# Patient Record
Sex: FEMALE | Race: OTHER
[De-identification: names, ages, dates, MRNs, and addresses within clinical notes are randomized per-mention and may not be internally consistent; named-entity substitution may affect disease eponyms.]

---

## 2019-10-08 ENCOUNTER — HOSPITAL ENCOUNTER (OUTPATIENT)
Dept: HOSPITAL 62 - WI | Age: 54
End: 2019-10-08
Attending: INTERNAL MEDICINE
Payer: COMMERCIAL

## 2019-10-08 DIAGNOSIS — N64.89: ICD-10-CM

## 2019-10-08 DIAGNOSIS — Z12.31: Primary | ICD-10-CM

## 2019-10-08 PROCEDURE — 77067 SCR MAMMO BI INCL CAD: CPT

## 2019-10-08 NOTE — WOMENS IMAGING REPORT
EXAM DESCRIPTION:  BILAT SCREENING MAMMO W/CAD



COMPLETED DATE/TIME:  10/8/2019 8:51 am



REASON FOR STUDY:  Z12.31 SCREENING [id number]  ENCNTR SCREEN MAMMOGRAM FOR MALIGNANT NEOPLASM OF BR
E



COMPARISON:  Baseline examination.



EXAM PARAMETERS:  Standard craniocaudal and mediolateral oblique views of each breast recorded using 
digital acquisition.

Read with the assistance of CAD.

.[facility] - [email or web address]me  Version 9.2



LIMITATIONS:  None.



FINDINGS:  RIGHT BREAST

MASSES: No suspicious masses.

CALCIFICATIONS: No new or suspicious calcifications.

ARCHITECTURAL DISTORTION: None.

DEVELOPING DENSITY: None.

ASYMMETRY:  Asymmetry in the subareolar region, 3.0 cm from the nipple.  This finding is best seen on
 the RCC view.

OTHER: No other significant findings.

LEFT BREAST

MASSES: No suspicious masses.

CALCIFICATIONS: No new or suspicious calcifications.

ARCHITECTURAL DISTORTION: None.

DEVELOPING DENSITY: None.

ASYMMETRY: None noted.

OTHER: No other significant findings.



IMPRESSION:  1.  Asymmetry Right breast.

0 Incomplete: Needs Additional Imaging Evaluation and/or prior Mammograms for Comparison.



BREAST DENSITY:  b. There are scattered areas of fibroglandular density.



BIRAD:  ASSESSMENT:  0 Incomplete:  Needs Additional Imaging Evaluation and/or prior Mammograms for C
omparison.



RECOMMENDATION:  1.  Special view mammogram Right breast:  digital tomosynthesis (preferable) or spot
 compression views, true lateral view included.  Ultrasound if needed.

The patient will be contacted for additional imaging.



COMMENT:  The patient has been notified of the results by letter per MQSA requirements. Additional no
tification policies are in place for contacting patient with suspicious or incomplete findings.

Quality ID #225: The American College of Radiology recommends an annual screening mammogram for women
 aged 40 years or over. This facility utilizes a reminder system to ensure that all patients receive 
reminder letters, and/or direct phone calls for appointments. This includes reminders for routine scr
eening mammograms, diagnostic mammograms, or other Breast Imaging Interventions when appropriate.  Th
is patient will be placed in the appropriate reminder system.



TECHNICAL DOCUMENTATION:  FINDING NUMBER: (1)

ASSESSMENT: (1)

JOB ID:  [id number]

 2011 [employer]- All Rights Reserved



Reading location - IP/workstation name: [email or web address]

## 2019-10-17 ENCOUNTER — HOSPITAL ENCOUNTER (OUTPATIENT)
Dept: HOSPITAL 62 - WI | Age: 54
End: 2019-10-17
Attending: INTERNAL MEDICINE
Payer: COMMERCIAL

## 2019-10-17 DIAGNOSIS — R92.2: Primary | ICD-10-CM

## 2019-10-17 PROCEDURE — 76642 ULTRASOUND BREAST LIMITED: CPT

## 2019-10-17 NOTE — WOMENS IMAGING REPORT
EXAM DESCRIPTION:  3D DX MAMMO RIGHT UNILAT; U/S BREAST UNILAT LIMITED



COMPLETED DATE/TIME:  10/17/2019 8:51 am; 10/17/2019 9:32 am



REASON FOR STUDY:  R92.2 RT DX; RT BREAST R92.2 R92.2  INCONCLUSIVE MAMMOGRAM



COMPARISON:  10/8/2019.



EXAM PARAMETERS:  True lateral and CC images acquired with tomosynthesis.  Spot compression lateral a
nd CC images also acquired.



LIMITATIONS:  None.



FINDINGS:  BREAST LATERALITY: right

MASSES: No suspicious masses.

CALCIFICATIONS: No new or suspicious calcifications.

ARCHITECTURAL DISTORTION: None.

DEVELOPING DENSITY: None.

ASYMMETRY: None noted.

OTHER: No other significant findings.

BREAST ULTRASOUND:

TECHNIQUE: Static and dynamic grayscale images acquired of the right breast in the specific areas of 
clinical/mammographic concern. Selected color Doppler images recorded.

ELASTOGRAPHY PERFORMED: No.

LIMITATIONS: None.

FINDINGS:

MASS: Small 2 mm cyst.  No solid mass identified.  Normal glandular tissue.

ELASTOGRAPHY CHARACTERISTICS: Not applicable.

OTHER: No other significant finding.



IMPRESSION:  Small 2 mm cyst.  No solid masses.  No worrisome mammographic or sonographic findings.



BREAST DENSITY:  b. There are scattered areas of fibroglandular density.



BIRAD:  ASSESSMENT:  2 Benign findings.



RECOMMENDATION:  RECOMMENDED FOLLOW UP: Birads 1 or 2: The patient should resume routine screening .

SPECIFIC INTERVENTION/IMAGING/CONSULTATION RECOMMENDED:No additional intervention/ imaging/consultati
on needed at this time.

COMMUNICATION:The imaging findings were not discussed with the patient. Her referring provider has be
en notified of the findings.



COMMENT:  The patient has been notified of the results by letter per SA requirements. Additional no
tification policies are in place for contacting patient with suspicious or incomplete findings.

Quality ID #225: The American College of Radiology recommends an annual screening mammogram for women
 aged 40 years or over. This facility utilizes a reminder system to ensure that all patients receive 
reminder letters, and/or direct phone calls for appointments. This includes reminders for routine scr
eening mammograms, diagnostic mammograms, or other Breast Imaging Interventions when appropriate.  Th
is patient will be placed in the appropriate reminder system.



TECHNICAL DOCUMENTATION:  FINDING NUMBER: (1)

ASSESSMENT: (1)

JOB ID:  [id number]

 2011 [employer]- All Rights Reserved



Reading location - [facility]

## 2019-10-17 NOTE — WOMENS IMAGING REPORT
EXAM DESCRIPTION:  3D DX MAMMO RIGHT UNILAT; U/S BREAST UNILAT LIMITED



COMPLETED DATE/TIME:  10/17/2019 8:51 am; 10/17/2019 9:32 am



REASON FOR STUDY:  R92.2 RT DX; RT BREAST R92.2 R92.2  INCONCLUSIVE MAMMOGRAM



COMPARISON:  10/8/2019.



EXAM PARAMETERS:  True lateral and CC images acquired with tomosynthesis.  Spot compression lateral a
nd CC images also acquired.



LIMITATIONS:  None.



FINDINGS:  BREAST LATERALITY: right

MASSES: No suspicious masses.

CALCIFICATIONS: No new or suspicious calcifications.

ARCHITECTURAL DISTORTION: None.

DEVELOPING DENSITY: None.

ASYMMETRY: None noted.

OTHER: No other significant findings.

BREAST ULTRASOUND:

TECHNIQUE: Static and dynamic grayscale images acquired of the right breast in the specific areas of 
clinical/mammographic concern. Selected color Doppler images recorded.

ELASTOGRAPHY PERFORMED: No.

LIMITATIONS: None.

FINDINGS:

MASS: Small 2 mm cyst.  No solid mass identified.  Normal glandular tissue.

ELASTOGRAPHY CHARACTERISTICS: Not applicable.

OTHER: No other significant finding.



IMPRESSION:  Small 2 mm cyst.  No solid masses.  No worrisome mammographic or sonographic findings.



BREAST DENSITY:  b. There are scattered areas of fibroglandular density.



BIRAD:  ASSESSMENT:  2 Benign findings.



RECOMMENDATION:  RECOMMENDED FOLLOW UP: Birads 1 or 2: The patient should resume routine screening .

SPECIFIC INTERVENTION/IMAGING/CONSULTATION RECOMMENDED:No additional intervention/ imaging/consultati
on needed at this time.

COMMUNICATION:The imaging findings were not discussed with the patient. Her referring provider has be
en notified of the findings.



COMMENT:  The patient has been notified of the results by letter per SA requirements. Additional no
tification policies are in place for contacting patient with suspicious or incomplete findings.

Quality ID #225: The American College of Radiology recommends an annual screening mammogram for women
 aged 40 years or over. This facility utilizes a reminder system to ensure that all patients receive 
reminder letters, and/or direct phone calls for appointments. This includes reminders for routine scr
eening mammograms, diagnostic mammograms, or other Breast Imaging Interventions when appropriate.  Th
is patient will be placed in the appropriate reminder system.



TECHNICAL DOCUMENTATION:  FINDING NUMBER: (1)

ASSESSMENT: (1)

JOB ID:  [id number]

 2011 [employer]- All Rights Reserved



Reading location - IP/workstation name: [facility]

## 2020-10-20 ENCOUNTER — HOSPITAL ENCOUNTER (OUTPATIENT)
Dept: HOSPITAL 62 - WI | Age: 55
End: 2020-10-20
Attending: INTERNAL MEDICINE
Payer: COMMERCIAL

## 2020-10-20 DIAGNOSIS — Z12.31: Primary | ICD-10-CM

## 2020-10-20 PROCEDURE — 77067 SCR MAMMO BI INCL CAD: CPT

## 2020-10-20 PROCEDURE — 77063 BREAST TOMOSYNTHESIS BI: CPT

## 2021-01-01 NOTE — WOMENS IMAGING REPORT
EXAM DESCRIPTION:  3D SCREENING MAMMO BILAT



IMAGES COMPLETED DATE/TIME:  10/20/2020 9:56 am



REASON FOR STUDY:  Z12.31 ENCNTR SCREEN MAMMOGRAM FOR MALIGNANT NEOPLASM OF BREAST Z12.31  ENCNTR SCR
EEN MAMMOGRAM FOR MALIGNANT NEOPLASM OF DEION



COMPARISON:  10/8/2019.



EXAM PARAMETERS:  Views: Standard craniocaudal and mediolateral oblique views of each breast recorded
 using digital acquisition and breast tomosynthesis.

Read with the assistance of CAD.

.[facility] - R2  Version 9.2



LIMITATIONS:  None.



FINDINGS:  No suspicious masses, suspicious calcifications or architectural distortion. No areas of c
oncern.



IMPRESSION:   NEGATIVE MAMMOGRAM. BIRADS 1.



BREAST DENSITY:  b. There are scattered areas of fibroglandular density.



BIRAD:  ASSESSMENT:  1 NEGATIVE



RECOMMENDATION:  ROUTINE SCREENING



COMMENT:  The patient has been notified of the results by letter per MQSA requirements. Additional no
tification policies are in place for contacting patient with suspicious or incomplete findings.

Quality ID #225: The American College of Radiology recommends an annual screening mammogram for women
 aged 40 years or over. This facility utilizes a reminder system to ensure that all patients receive 
reminder letters, and/or direct phone calls for appointments. This includes reminders for routine scr
eening mammograms, diagnostic mammograms, or other Breast Imaging Interventions when appropriate.  Th
is patient will be placed in the appropriate reminder system.



TECHNICAL DOCUMENTATION:  FINDING NUMBER: (1)

ASSESSMENT:  (1)

JOB ID:  [id number]

 2011 [employer]- All Rights Reserved



Reading location - IP/workstation name: [facility] [Home] : at home, [unfilled] , at the time of the visit. [Medical Office: ([facility])___] : at the medical office located in  [Mother] : mother [FreeTextEntry3] : mother [FreeTextEntry6] : Mother has mastitis, dicloxacillin x 10 d. \par Wants to know if can continue to nurse on this. \par Also has gas. \par Friend gave her baby probiotic - powder.\par \par 1. Should continue to nurse, discussed what antibiotic to baby can do. \par 2. Can try mylicon infant drops for gas, stop if not helping, disc mom diet.\par 3. Do not give the powder her friend gave, unless we see it first. \par Can give [employer] infant drops probiotic if wants. \par \par 11 mins. \par